# Patient Record
Sex: FEMALE | Race: OTHER | URBAN - METROPOLITAN AREA
[De-identification: names, ages, dates, MRNs, and addresses within clinical notes are randomized per-mention and may not be internally consistent; named-entity substitution may affect disease eponyms.]

---

## 2018-07-17 ENCOUNTER — APPOINTMENT (OUTPATIENT)
Dept: URBAN - METROPOLITAN AREA CLINIC 186 | Age: 83
Setting detail: DERMATOLOGY
End: 2018-07-17

## 2018-07-17 DIAGNOSIS — R21 RASH AND OTHER NONSPECIFIC SKIN ERUPTION: ICD-10-CM

## 2018-07-17 PROBLEM — F41.9 ANXIETY DISORDER, UNSPECIFIED: Status: ACTIVE | Noted: 2018-07-17

## 2018-07-17 PROBLEM — E03.9 HYPOTHYROIDISM, UNSPECIFIED: Status: ACTIVE | Noted: 2018-07-17

## 2018-07-17 PROBLEM — E78.5 HYPERLIPIDEMIA, UNSPECIFIED: Status: ACTIVE | Noted: 2018-07-17

## 2018-07-17 PROBLEM — M12.9 ARTHROPATHY, UNSPECIFIED: Status: ACTIVE | Noted: 2018-07-17

## 2018-07-17 PROBLEM — I10 ESSENTIAL (PRIMARY) HYPERTENSION: Status: ACTIVE | Noted: 2018-07-17

## 2018-07-17 PROCEDURE — 99202 OFFICE O/P NEW SF 15 MIN: CPT

## 2018-07-17 PROCEDURE — OTHER COUNSELING: OTHER

## 2018-07-17 PROCEDURE — OTHER ADDITIONAL NOTES: OTHER

## 2018-07-17 PROCEDURE — OTHER OTHER: OTHER

## 2018-07-17 PROCEDURE — OTHER ORDER TESTS: OTHER

## 2018-07-17 PROCEDURE — OTHER TREATMENT REGIMEN: OTHER

## 2018-07-17 ASSESSMENT — LOCATION ZONE DERM
LOCATION ZONE: LEG
LOCATION ZONE: NECK

## 2018-07-17 ASSESSMENT — LOCATION SIMPLE DESCRIPTION DERM
LOCATION SIMPLE: LEFT PRETIBIAL REGION
LOCATION SIMPLE: RIGHT PRETIBIAL REGION
LOCATION SIMPLE: POSTERIOR NECK

## 2018-07-17 ASSESSMENT — LOCATION DETAILED DESCRIPTION DERM
LOCATION DETAILED: RIGHT PROXIMAL PRETIBIAL REGION
LOCATION DETAILED: LEFT PROXIMAL PRETIBIAL REGION
LOCATION DETAILED: MID POSTERIOR NECK

## 2018-07-17 NOTE — PROCEDURE: ADDITIONAL NOTES
Additional Notes: Discussed that with no symptoms to legs and mild irritation to neck area it is hard to determine a definitive diagnosis.  Discussed ddx of pruritus at length. Today no sign of xerosis no sign of bites, would have to consider underlying thyroid, kidney or liver disease as well. Patient has know thyroid disease. \\nRecommend patient get current blood work. If patient starts to develop the spots, patient can take photos and contact office for follow up. \\nPatient to continue with gentle skin care products.\\nPatient will sign release so we can obtain results from last blood work. Will also send prescription for patient with patient. Patient does not need to get the recent blood work until we review past results. We will contact patient once we receive the blood work from primary care physician. \\nPdanica has video phone

## 2018-07-17 NOTE — HPI: RASH
How Severe Is Your Rash?: mild
Is This A New Presentation, Or A Follow-Up?: Rash
Additional History: Has been using gain and all free and clear detergent, says she switches between the two. Says the itching wakes her up at night. Says she does not notice a different at night, says she feels like she is getting bit by something, says the bites do not itch but she cannot feel it happening. Comes and goes, says she can sometimes feel it in her ears as well.

## 2018-07-17 NOTE — PROCEDURE: OTHER
Note Text (......Xxx Chief Complaint.): This diagnosis correlates with the
Detail Level: Zone
Other (Free Text): Referral correspondence: faxed letter to Dr Barbara Georges